# Patient Record
Sex: MALE | Race: WHITE | NOT HISPANIC OR LATINO
[De-identification: names, ages, dates, MRNs, and addresses within clinical notes are randomized per-mention and may not be internally consistent; named-entity substitution may affect disease eponyms.]

---

## 2017-04-12 ENCOUNTER — RX RENEWAL (OUTPATIENT)
Age: 47
End: 2017-04-12

## 2017-04-14 ENCOUNTER — RX RENEWAL (OUTPATIENT)
Age: 47
End: 2017-04-14

## 2018-06-07 ENCOUNTER — APPOINTMENT (OUTPATIENT)
Dept: FAMILY MEDICINE | Facility: CLINIC | Age: 48
End: 2018-06-07
Payer: MEDICAID

## 2018-06-07 ENCOUNTER — NON-APPOINTMENT (OUTPATIENT)
Age: 48
End: 2018-06-07

## 2018-06-07 VITALS
SYSTOLIC BLOOD PRESSURE: 138 MMHG | RESPIRATION RATE: 20 BRPM | HEART RATE: 72 BPM | DIASTOLIC BLOOD PRESSURE: 80 MMHG | HEIGHT: 63 IN | WEIGHT: 212 LBS | OXYGEN SATURATION: 97 % | BODY MASS INDEX: 37.56 KG/M2

## 2018-06-07 VITALS — DIASTOLIC BLOOD PRESSURE: 78 MMHG | HEART RATE: 66 BPM | RESPIRATION RATE: 16 BRPM | SYSTOLIC BLOOD PRESSURE: 112 MMHG

## 2018-06-07 PROCEDURE — 93000 ELECTROCARDIOGRAM COMPLETE: CPT

## 2018-06-07 PROCEDURE — 36415 COLL VENOUS BLD VENIPUNCTURE: CPT

## 2018-06-07 PROCEDURE — 99396 PREV VISIT EST AGE 40-64: CPT | Mod: 25

## 2018-06-07 NOTE — PHYSICAL EXAM
[No Acute Distress] : no acute distress [Well Nourished] : well nourished [Well Developed] : well developed [Well-Appearing] : well-appearing [Normal Sclera/Conjunctiva] : normal sclera/conjunctiva [PERRL] : pupils equal round and reactive to light [EOMI] : extraocular movements intact [Normal Oropharynx] : the oropharynx was normal [No JVD] : no jugular venous distention [Supple] : supple [No Lymphadenopathy] : no lymphadenopathy [Thyroid Normal, No Nodules] : the thyroid was normal and there were no nodules present [No Respiratory Distress] : no respiratory distress  [Clear to Auscultation] : lungs were clear to auscultation bilaterally [No Accessory Muscle Use] : no accessory muscle use [Normal Rate] : normal rate  [Regular Rhythm] : with a regular rhythm [Normal S1, S2] : normal S1 and S2 [No Murmur] : no murmur heard [No Carotid Bruits] : no carotid bruits [No Abdominal Bruit] : a ~M bruit was not heard ~T in the abdomen [No Varicosities] : no varicosities [No Edema] : there was no peripheral edema [No Extremity Clubbing/Cyanosis] : no extremity clubbing/cyanosis [Soft] : abdomen soft [Non Tender] : non-tender [Non-distended] : non-distended [No Masses] : no abdominal mass palpated [No HSM] : no HSM [Normal Bowel Sounds] : normal bowel sounds [Normal Sphincter Tone] : normal sphincter tone [No Mass] : no mass [Urethral Meatus] : meatus normal [Urinary Bladder Findings] : the bladder was normal on palpation [Scrotum] : the scrotum was normal [Testes Mass (___cm)] : there were no testicular masses [No Prostate Nodules] : no prostate nodules [Normal Posterior Cervical Nodes] : no posterior cervical lymphadenopathy [Normal Anterior Cervical Nodes] : no anterior cervical lymphadenopathy [No CVA Tenderness] : no CVA  tenderness [No Spinal Tenderness] : no spinal tenderness [No Joint Swelling] : no joint swelling [Grossly Normal Strength/Tone] : grossly normal strength/tone [No Rash] : no rash [Normal Gait] : normal gait [Coordination Grossly Intact] : coordination grossly intact [No Focal Deficits] : no focal deficits [Deep Tendon Reflexes (DTR)] : deep tendon reflexes were 2+ and symmetric [Normal Affect] : the affect was normal [Normal Insight/Judgement] : insight and judgment were intact [de-identified] : short neck  small posterior  pharynx

## 2018-06-07 NOTE — ASSESSMENT
[FreeTextEntry1] : snores  has  witnessed  apea day time  somnolence will order  sleep  study and  labs

## 2018-06-07 NOTE — HEALTH RISK ASSESSMENT
[Very Good] : ~his/her~  mood as very good [No falls in past year] : Patient reported no falls in the past year [0] : 2) Feeling down, depressed, or hopeless: Not at all (0) [HIV test declined] : HIV test declined [Hepatitis C test declined] : Hepatitis C test declined [None] : None [Alone] : lives alone [Employed] : employed [High School] : high school [Single] : single [Fully functional (bathing, dressing, toileting, transferring, walking, feeding)] : Fully functional (bathing, dressing, toileting, transferring, walking, feeding) [Reports changes in dental health] : Reports changes in dental health [Smoke Detector] : smoke detector [Carbon Monoxide Detector] : carbon monoxide detector [Seat Belt] :  uses seat belt [] : No [de-identified] : occ  [Change in mental status noted] : No change in mental status noted [Sexually Active] : not sexually active [Reports changes in hearing] : Reports no changes in hearing [Reports changes in vision] : Reports no changes in vision [de-identified] : construction

## 2018-06-20 ENCOUNTER — APPOINTMENT (OUTPATIENT)
Dept: FAMILY MEDICINE | Facility: CLINIC | Age: 48
End: 2018-06-20
Payer: MEDICAID

## 2018-06-20 VITALS
WEIGHT: 212 LBS | SYSTOLIC BLOOD PRESSURE: 136 MMHG | BODY MASS INDEX: 37.56 KG/M2 | RESPIRATION RATE: 16 BRPM | TEMPERATURE: 98.4 F | HEART RATE: 84 BPM | OXYGEN SATURATION: 96 % | HEIGHT: 63 IN | DIASTOLIC BLOOD PRESSURE: 80 MMHG

## 2018-06-20 DIAGNOSIS — H93.19 TINNITUS, UNSPECIFIED EAR: ICD-10-CM

## 2018-06-20 PROCEDURE — 99213 OFFICE O/P EST LOW 20 MIN: CPT

## 2018-06-20 NOTE — PHYSICAL EXAM
[No Acute Distress] : no acute distress [Well Nourished] : well nourished [Well Developed] : well developed [Normal Sclera/Conjunctiva] : normal sclera/conjunctiva [Normal Outer Ear/Nose] : the outer ears and nose were normal in appearance [Normal Oropharynx] : the oropharynx was normal [Supple] : supple [No Lymphadenopathy] : no lymphadenopathy [Thyroid Normal, No Nodules] : the thyroid was normal and there were no nodules present [No Respiratory Distress] : no respiratory distress  [Clear to Auscultation] : lungs were clear to auscultation bilaterally [No Accessory Muscle Use] : no accessory muscle use [Normal Rate] : normal rate  [Regular Rhythm] : with a regular rhythm [Normal S1, S2] : normal S1 and S2 [No Murmur] : no murmur heard

## 2019-09-19 ENCOUNTER — APPOINTMENT (OUTPATIENT)
Dept: FAMILY MEDICINE | Facility: CLINIC | Age: 49
End: 2019-09-19
Payer: MEDICAID

## 2019-09-19 VITALS
BODY MASS INDEX: 37.56 KG/M2 | DIASTOLIC BLOOD PRESSURE: 90 MMHG | OXYGEN SATURATION: 96 % | WEIGHT: 220 LBS | HEART RATE: 103 BPM | HEIGHT: 64 IN | TEMPERATURE: 98.4 F | SYSTOLIC BLOOD PRESSURE: 140 MMHG | RESPIRATION RATE: 16 BRPM

## 2019-09-19 VITALS — SYSTOLIC BLOOD PRESSURE: 146 MMHG | DIASTOLIC BLOOD PRESSURE: 96 MMHG

## 2019-09-19 DIAGNOSIS — Z87.09 PERSONAL HISTORY OF OTHER DISEASES OF THE RESPIRATORY SYSTEM: ICD-10-CM

## 2019-09-19 PROCEDURE — 99214 OFFICE O/P EST MOD 30 MIN: CPT

## 2019-09-21 RX ORDER — AMOXICILLIN 500 MG/1
500 CAPSULE ORAL
Qty: 21 | Refills: 0 | Status: COMPLETED | COMMUNITY
Start: 2019-09-16

## 2019-09-21 NOTE — HISTORY OF PRESENT ILLNESS
[FreeTextEntry8] : Pt c/o throat pain x5 days. Pt went to [facility] ER, was dx w/ pharyngitis. Pt was started on amoxicillin course, culture was neg for strep and fungal infx. Pt was advised that he have a repeat culture done.\par Pt also has hx SHELDON, would like referral to sleep study for titration and mask fitting\par Pt also found to have multiple episodes of elevated BP recently. Pt has gained 8 lbs in past year through poor diet

## 2019-09-21 NOTE — ASSESSMENT
[FreeTextEntry1] : pharyngitis - pt to continue nystating gargles, resend throat cx\par obesity - Advised lifestyle modifications for wt loss. Healthy diet and regular exercise regimen discussed w/ pt\par SHELDON - redo sleep study\par htn- start losartan, f/u in 2-3 months to recheck BP

## 2019-09-22 LAB — BACTERIA THROAT CULT: NORMAL

## 2019-10-17 ENCOUNTER — APPOINTMENT (OUTPATIENT)
Dept: FAMILY MEDICINE | Facility: CLINIC | Age: 49
End: 2019-10-17
Payer: MEDICAID

## 2019-10-17 VITALS
HEART RATE: 96 BPM | HEIGHT: 64 IN | OXYGEN SATURATION: 96 % | BODY MASS INDEX: 38.8 KG/M2 | WEIGHT: 227.25 LBS | SYSTOLIC BLOOD PRESSURE: 140 MMHG | DIASTOLIC BLOOD PRESSURE: 82 MMHG

## 2019-10-17 PROCEDURE — 20610 DRAIN/INJ JOINT/BURSA W/O US: CPT

## 2019-10-17 PROCEDURE — 99213 OFFICE O/P EST LOW 20 MIN: CPT | Mod: 25

## 2019-10-17 PROCEDURE — 90674 CCIIV4 VAC NO PRSV 0.5 ML IM: CPT

## 2019-10-17 PROCEDURE — G0008: CPT

## 2019-10-17 RX ADMIN — METHYLPREDNISOLONE ACETATE 40 MG/ML: 40 INJECTION, SUSPENSION INTRA-ARTICULAR; INTRALESIONAL; INTRAMUSCULAR; SOFT TISSUE at 00:00
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
## 2019-10-17 NOTE — ASSESSMENT
[FreeTextEntry1] : 20 cc of  serosanguineous  fluid  removed  and  40  mg of depomedrol  with lidocaine  infused

## 2019-10-18 LAB
B PERT IGG+IGM PNL SER: ABNORMAL
COLOR FLD: YELLOW
EOSINOPHIL # FLD MANUAL: 0 %
FLUID INTAKE SUBSTANCE CLASS: NORMAL
LYMPHOCYTES # FLD MANUAL: 92 %
MESOTHL CELL NFR FLD: 0 %
MONOS+MACROS NFR FLD MANUAL: 6 %
NEUTS SEG # FLD MANUAL: 2 %
NRBC # FLD: 0
RBC # FLD MANUAL: 63 /UL
TOTAL CELLS COUNTED FLD: 287 /UL
TUBE TYPE: NORMAL
UNIDENT CELLS NFR FLD MANUAL: 0 %
VARIANT LYMPHS # FLD MANUAL: 0 %

## 2019-10-18 RX ORDER — METHYLPRED ACET/NACL,ISO-OS/PF 40 MG/ML
40 VIAL (ML) INJECTION
Qty: 1 | Refills: 0 | Status: COMPLETED | OUTPATIENT
Start: 2019-10-17

## 2019-11-03 LAB — BACTERIA FLD CULT: NORMAL

## 2019-12-02 ENCOUNTER — APPOINTMENT (OUTPATIENT)
Dept: FAMILY MEDICINE | Facility: CLINIC | Age: 49
End: 2019-12-02
Payer: MEDICAID

## 2019-12-02 VITALS
OXYGEN SATURATION: 96 % | SYSTOLIC BLOOD PRESSURE: 126 MMHG | DIASTOLIC BLOOD PRESSURE: 82 MMHG | WEIGHT: 223.13 LBS | HEIGHT: 64 IN | BODY MASS INDEX: 38.09 KG/M2 | HEART RATE: 113 BPM

## 2019-12-02 VITALS — DIASTOLIC BLOOD PRESSURE: 84 MMHG | SYSTOLIC BLOOD PRESSURE: 120 MMHG

## 2019-12-02 PROCEDURE — G0444 DEPRESSION SCREEN ANNUAL: CPT | Mod: 59

## 2019-12-02 PROCEDURE — 99213 OFFICE O/P EST LOW 20 MIN: CPT | Mod: 25

## 2019-12-03 NOTE — HISTORY OF PRESENT ILLNESS
[de-identified] : Pt for f/u HTN, pt was started on losartan 25mg which pt has tolerated well. Denies CP, palpitations, dyspnea, n/v\par

## 2019-12-05 LAB
ANION GAP SERPL CALC-SCNC: 14 MMOL/L
BUN SERPL-MCNC: 13 MG/DL
CALCIUM SERPL-MCNC: 9.7 MG/DL
CHLORIDE SERPL-SCNC: 105 MMOL/L
CO2 SERPL-SCNC: 23 MMOL/L
CREAT SERPL-MCNC: 0.89 MG/DL
GLUCOSE SERPL-MCNC: 97 MG/DL
POTASSIUM SERPL-SCNC: 4.3 MMOL/L
SODIUM SERPL-SCNC: 142 MMOL/L

## 2020-07-13 ENCOUNTER — APPOINTMENT (OUTPATIENT)
Dept: FAMILY MEDICINE | Facility: CLINIC | Age: 50
End: 2020-07-13
Payer: MEDICAID

## 2020-07-13 VITALS
SYSTOLIC BLOOD PRESSURE: 110 MMHG | HEIGHT: 64 IN | OXYGEN SATURATION: 98 % | WEIGHT: 213 LBS | DIASTOLIC BLOOD PRESSURE: 80 MMHG | BODY MASS INDEX: 36.37 KG/M2 | HEART RATE: 87 BPM

## 2020-07-13 VITALS — HEART RATE: 72 BPM | RESPIRATION RATE: 1 BRPM | DIASTOLIC BLOOD PRESSURE: 80 MMHG | SYSTOLIC BLOOD PRESSURE: 110 MMHG

## 2020-07-13 DIAGNOSIS — M70.52 OTHER BURSITIS OF KNEE, LEFT KNEE: ICD-10-CM

## 2020-07-13 DIAGNOSIS — M62.08 SEPARATION OF MUSCLE (NONTRAUMATIC), OTHER SITE: ICD-10-CM

## 2020-07-13 PROCEDURE — 20610 DRAIN/INJ JOINT/BURSA W/O US: CPT

## 2020-07-13 PROCEDURE — 99214 OFFICE O/P EST MOD 30 MIN: CPT | Mod: 25

## 2020-07-13 RX ORDER — METHYLPRED ACET/NACL,ISO-OS/PF 40 MG/ML
40 VIAL (ML) INJECTION
Qty: 1 | Refills: 0 | Status: COMPLETED | OUTPATIENT
Start: 2020-07-13

## 2020-07-13 RX ADMIN — METHYLPREDNISOLONE ACETATE 40 MG/ML: 40 INJECTION, SUSPENSION INTRA-ARTICULAR; INTRALESIONAL; INTRAMUSCULAR; SOFT TISSUE at 00:00

## 2020-07-13 NOTE — ASSESSMENT
[FreeTextEntry1] : bp at  goal with wt loss never  went for sleep  study 20 cc of  serosanguineous fluid removed  from  left pre patella bursa shantell  40 mg of  medrol and knee  strapped  observation of  rectus diasthesis

## 2020-07-13 NOTE — PHYSICAL EXAM
[de-identified] : diathesis of  rectus abdomin  [de-identified] : sweling left  supraplatella bursa

## 2020-07-23 ENCOUNTER — APPOINTMENT (OUTPATIENT)
Dept: FAMILY MEDICINE | Facility: CLINIC | Age: 50
End: 2020-07-23
Payer: MEDICAID

## 2020-07-23 VITALS — SYSTOLIC BLOOD PRESSURE: 130 MMHG | DIASTOLIC BLOOD PRESSURE: 90 MMHG | HEART RATE: 94 BPM | RESPIRATION RATE: 16 BRPM

## 2020-07-23 VITALS
HEART RATE: 102 BPM | HEIGHT: 64 IN | BODY MASS INDEX: 35.51 KG/M2 | OXYGEN SATURATION: 95 % | DIASTOLIC BLOOD PRESSURE: 86 MMHG | SYSTOLIC BLOOD PRESSURE: 134 MMHG | WEIGHT: 208 LBS | RESPIRATION RATE: 16 BRPM

## 2020-07-23 DIAGNOSIS — I10 ESSENTIAL (PRIMARY) HYPERTENSION: ICD-10-CM

## 2020-07-23 PROCEDURE — 99213 OFFICE O/P EST LOW 20 MIN: CPT

## 2020-07-23 NOTE — HISTORY OF PRESENT ILLNESS
[FreeTextEntry1] : pt  c/o  fatigue  [de-identified] : sleeps poorly snores  wakes up tired falls asleep while in waiting room snores girlfriend  pt stopped  breathing

## 2020-07-23 NOTE — PHYSICAL EXAM
[No Acute Distress] : no acute distress [PERRL] : pupils equal round and reactive to light [Clear to Auscultation] : lungs were clear to auscultation bilaterally [Normal Oropharynx] : the oropharynx was normal [Normal] : soft, non-tender, non-distended, no masses palpated, no HSM and normal bowel sounds [No Edema] : there was no peripheral edema [Normal Anterior Cervical Nodes] : no anterior cervical lymphadenopathy [No Focal Deficits] : no focal deficits [de-identified] : neck size 21 inches  [Normal Insight/Judgement] : insight and judgment were intact

## 2020-09-19 ENCOUNTER — OUTPATIENT (OUTPATIENT)
Dept: OUTPATIENT SERVICES | Facility: HOSPITAL | Age: 50
LOS: 1 days | End: 2020-09-19
Payer: MEDICAID

## 2020-09-19 DIAGNOSIS — G47.33 OBSTRUCTIVE SLEEP APNEA (ADULT) (PEDIATRIC): ICD-10-CM

## 2020-09-19 PROCEDURE — 95806 SLEEP STUDY UNATT&RESP EFFT: CPT

## 2020-09-19 PROCEDURE — 95806 SLEEP STUDY UNATT&RESP EFFT: CPT | Mod: 26

## 2020-09-19 PROCEDURE — G0399: CPT

## 2020-10-08 ENCOUNTER — APPOINTMENT (OUTPATIENT)
Dept: FAMILY MEDICINE | Facility: CLINIC | Age: 50
End: 2020-10-08
Payer: MEDICAID

## 2020-10-08 VITALS
BODY MASS INDEX: 35.51 KG/M2 | TEMPERATURE: 98 F | SYSTOLIC BLOOD PRESSURE: 130 MMHG | HEART RATE: 81 BPM | WEIGHT: 208 LBS | OXYGEN SATURATION: 96 % | HEIGHT: 64 IN | DIASTOLIC BLOOD PRESSURE: 82 MMHG

## 2020-10-08 VITALS — HEART RATE: 80 BPM | DIASTOLIC BLOOD PRESSURE: 94 MMHG | RESPIRATION RATE: 16 BRPM | SYSTOLIC BLOOD PRESSURE: 140 MMHG

## 2020-10-08 DIAGNOSIS — S99.912A UNSPECIFIED INJURY OF LEFT ANKLE, INITIAL ENCOUNTER: ICD-10-CM

## 2020-10-08 PROCEDURE — 99214 OFFICE O/P EST MOD 30 MIN: CPT
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
## 2020-10-08 NOTE — PHYSICAL EXAM
[No Acute Distress] : no acute distress [PERRL] : pupils equal round and reactive to light [Normal Oropharynx] : the oropharynx was normal [Supple] : supple [Clear to Auscultation] : lungs were clear to auscultation bilaterally [Regular Rhythm] : with a regular rhythm [No Murmur] : no murmur heard [No Edema] : there was no peripheral edema [Normal Supraclavicular Nodes] : no supraclavicular lymphadenopathy [Normal Posterior Cervical Nodes] : no posterior cervical lymphadenopathy [Normal Anterior Cervical Nodes] : no anterior cervical lymphadenopathy [No Rash] : no rash [de-identified] : TENDERNESS LATERAL  ANKLE AND TIBEA SLLIGHT SWELLING

## 2020-10-23 ENCOUNTER — APPOINTMENT (OUTPATIENT)
Dept: DISASTER EMERGENCY | Facility: CLINIC | Age: 50
End: 2020-10-23

## 2020-10-23 DIAGNOSIS — Z01.818 ENCOUNTER FOR OTHER PREPROCEDURAL EXAMINATION: ICD-10-CM

## 2020-10-24 LAB — SARS-COV-2 N GENE NPH QL NAA+PROBE: NOT DETECTED

## 2020-10-25 ENCOUNTER — OUTPATIENT (OUTPATIENT)
Dept: OUTPATIENT SERVICES | Facility: HOSPITAL | Age: 50
LOS: 1 days | End: 2020-10-25
Payer: MEDICAID

## 2020-10-25 DIAGNOSIS — G47.33 OBSTRUCTIVE SLEEP APNEA (ADULT) (PEDIATRIC): ICD-10-CM

## 2020-10-25 PROCEDURE — 95810 POLYSOM 6/> YRS 4/> PARAM: CPT

## 2020-10-25 PROCEDURE — 95811 POLYSOM 6/>YRS CPAP 4/> PARM: CPT | Mod: 26

## 2020-10-25 PROCEDURE — 95811 POLYSOM 6/>YRS CPAP 4/> PARM: CPT

## 2020-11-05 ENCOUNTER — APPOINTMENT (OUTPATIENT)
Dept: PULMONOLOGY | Facility: CLINIC | Age: 50
End: 2020-11-05
Payer: MEDICAID

## 2020-11-05 VITALS
WEIGHT: 213 LBS | OXYGEN SATURATION: 96 % | HEART RATE: 82 BPM | HEIGHT: 62 IN | BODY MASS INDEX: 39.2 KG/M2 | SYSTOLIC BLOOD PRESSURE: 140 MMHG | DIASTOLIC BLOOD PRESSURE: 69 MMHG

## 2020-11-05 VITALS — RESPIRATION RATE: 16 BRPM

## 2020-11-05 DIAGNOSIS — E66.9 OBESITY, UNSPECIFIED: ICD-10-CM

## 2020-11-05 DIAGNOSIS — G47.33 OBSTRUCTIVE SLEEP APNEA (ADULT) (PEDIATRIC): ICD-10-CM

## 2020-11-05 DIAGNOSIS — G47.31 PRIMARY CENTRAL SLEEP APNEA: ICD-10-CM

## 2020-11-05 PROCEDURE — 99072 ADDL SUPL MATRL&STAF TM PHE: CPT

## 2020-11-05 PROCEDURE — 99204 OFFICE O/P NEW MOD 45 MIN: CPT

## 2020-11-05 RX ORDER — TRAMADOL HYDROCHLORIDE 50 MG/1
50 TABLET, COATED ORAL
Qty: 20 | Refills: 0 | Status: DISCONTINUED | COMMUNITY
Start: 2020-10-08 | End: 2020-11-05

## 2020-11-05 RX ORDER — LOSARTAN POTASSIUM 25 MG/1
25 TABLET, FILM COATED ORAL DAILY
Qty: 90 | Refills: 1 | Status: DISCONTINUED | COMMUNITY
Start: 2019-09-19 | End: 2020-11-05

## 2020-11-05 RX ORDER — NYSTATIN 100000 [USP'U]/ML
100000 SUSPENSION ORAL
Qty: 280 | Refills: 0 | Status: DISCONTINUED | COMMUNITY
Start: 2019-09-19 | End: 2020-11-05

## 2020-11-05 NOTE — HISTORY OF PRESENT ILLNESS
[TextBox_4] : The patient is a 50-year-old male who has been complaining of excessive daytime sleepiness for many years. Loud snoring was noted with interrupted sleep at night. He underwent a home sleep study in September and more recently a CPAP/BiPAP titration.He is obese with an 18 inch collar. Denies shortness of breath cough or wheeze. He reports he was sleepy as a child. [Obstructive Sleep Apnea] : obstructive sleep apnea [Central sleep apnea (CSA)/ Mixed SHELDON] : central sleep apnea (CSA)/ mixed SHELDON [Awakes Unrefreshed] : awakes unrefreshed [Awakes with Headache] : awakes with headache [Difficulty Initiating Sleep] : difficulty initiating sleep [Difficulty Maintaining Sleep] : difficulty maintaining sleep [Snoring] : snoring [Witnessed Apneas] : witnessed apneas [Home] : home [TextBox_77] : 830pm [TextBox_79] : 7am [TextBox_81] : 30 [TextBox_83] : christiano. [TextBox_89] : 3-4 [TextBox_100] : 9/20 [TextBox_108] : 51 [TextBox_112] : 68 [TextBox_116] : 76 [TextBox_104] : 10/20 [TextBox_165] : I reviewed the patient's sleep study with the patient.\par The patient's CPAP study showed that he was therapeutic at BiPAP of 17/13. There were treatment emergent central apneas at all pressures and a backup rate of 10 was added. [ESS] : 18

## 2020-11-05 NOTE — PHYSICAL EXAM
[No Acute Distress] : no acute distress [Low Lying Soft Palate] : low lying soft palate [Elongated Uvula] : elongated uvula [Enlarged Base of the Tongue] : enlarged base of the tongue [IV] : Mallampati Class: IV [Normal Appearance] : normal appearance [Neck Circumference: ___] : neck circumference: [unfilled] [No Neck Mass] : no neck mass [Normal Rate/Rhythm] : normal rate/rhythm [Normal S1, S2] : normal s1, s2 [No Murmurs] : no murmurs [No Resp Distress] : no resp distress [Clear to Auscultation Bilaterally] : clear to auscultation bilaterally [No Abnormalities] : no abnormalities [Benign] : benign [Normal Gait] : normal gait [No Clubbing] : no clubbing [No Cyanosis] : no cyanosis [No Edema] : no edema [FROM] : FROM [Normal Color/ Pigmentation] : normal color/ pigmentation [No Focal Deficits] : no focal deficits [Oriented x3] : oriented x3 [Normal Affect] : normal affect [TextBox_2] : obese

## 2020-11-05 NOTE — ASSESSMENT
[FreeTextEntry1] : The patient has mixed obstructive and central sleep apnea significant treatment emergent central apneas. I explained the pathophysiology of sleep apnea to the patient and its association with excessive sleepiness and hypertension.\par \par BiPAP 17/13 with a backup rate of 10 was ordered for the patient. The patient was instructed to begin wearing it with the goal being all night every night. The patient was given minimum acceptable treatment parameters. He will follow up here 3 months to review compliance and efficacy.

## 2020-12-21 PROBLEM — Z87.09 HISTORY OF PHARYNGITIS: Status: RESOLVED | Noted: 2019-09-19 | Resolved: 2020-12-21

## 2021-02-05 ENCOUNTER — APPOINTMENT (OUTPATIENT)
Dept: PULMONOLOGY | Facility: CLINIC | Age: 51
End: 2021-02-05

## 2021-03-24 ENCOUNTER — APPOINTMENT (OUTPATIENT)
Dept: FAMILY MEDICINE | Facility: CLINIC | Age: 51
End: 2021-03-24
Payer: MEDICAID

## 2021-03-24 DIAGNOSIS — U07.1 COVID-19: ICD-10-CM

## 2021-03-24 PROCEDURE — 99213 OFFICE O/P EST LOW 20 MIN: CPT | Mod: 95

## 2021-03-24 RX ORDER — METHYLPREDNISOLONE 4 MG/1
4 TABLET ORAL
Qty: 1 | Refills: 0 | Status: ACTIVE | COMMUNITY
Start: 2021-03-24 | End: 1900-01-01

## 2021-06-16 ENCOUNTER — APPOINTMENT (OUTPATIENT)
Dept: FAMILY MEDICINE | Facility: CLINIC | Age: 51
End: 2021-06-16
Payer: MEDICAID

## 2021-06-16 VITALS
OXYGEN SATURATION: 97 % | HEART RATE: 80 BPM | TEMPERATURE: 97.7 F | WEIGHT: 211 LBS | HEIGHT: 62 IN | BODY MASS INDEX: 38.83 KG/M2 | DIASTOLIC BLOOD PRESSURE: 80 MMHG | SYSTOLIC BLOOD PRESSURE: 132 MMHG

## 2021-06-16 PROCEDURE — 99072 ADDL SUPL MATRL&STAF TM PHE: CPT

## 2021-06-16 PROCEDURE — 99213 OFFICE O/P EST LOW 20 MIN: CPT

## 2021-06-21 ENCOUNTER — APPOINTMENT (OUTPATIENT)
Age: 51
End: 2021-06-21
Payer: MEDICAID

## 2021-06-21 VITALS
WEIGHT: 220 LBS | DIASTOLIC BLOOD PRESSURE: 93 MMHG | BODY MASS INDEX: 37.56 KG/M2 | HEIGHT: 64 IN | HEART RATE: 78 BPM | SYSTOLIC BLOOD PRESSURE: 135 MMHG

## 2021-06-21 DIAGNOSIS — S46.212D STRAIN OF MUSCLE, FASCIA AND TENDON OF OTHER PARTS OF BICEPS, LEFT ARM, SUBSEQUENT ENCOUNTER: ICD-10-CM

## 2021-06-21 DIAGNOSIS — Z78.9 OTHER SPECIFIED HEALTH STATUS: ICD-10-CM

## 2021-06-21 PROCEDURE — 99204 OFFICE O/P NEW MOD 45 MIN: CPT

## 2021-06-21 PROCEDURE — 73080 X-RAY EXAM OF ELBOW: CPT | Mod: LT

## 2021-06-21 PROCEDURE — 99072 ADDL SUPL MATRL&STAF TM PHE: CPT

## 2021-08-10 ENCOUNTER — RX RENEWAL (OUTPATIENT)
Age: 51
End: 2021-08-10

## 2021-08-10 RX ORDER — MELOXICAM 7.5 MG/1
7.5 TABLET ORAL DAILY
Qty: 30 | Refills: 0 | Status: ACTIVE | COMMUNITY
Start: 2021-06-21 | End: 1900-01-01

## 2022-07-19 ENCOUNTER — RX RENEWAL (OUTPATIENT)
Age: 52
End: 2022-07-19

## 2022-07-19 RX ORDER — ALBUTEROL SULFATE 90 UG/1
108 (90 BASE) INHALANT RESPIRATORY (INHALATION)
Qty: 1 | Refills: 2 | Status: ACTIVE | COMMUNITY
Start: 2021-03-24 | End: 1900-01-01

## 2022-11-02 NOTE — HISTORY OF PRESENT ILLNESS
What Type Of Note Output Would You Prefer (Optional)?: Standard Output How Severe Is Your Skin Lesion?: moderate [FreeTextEntry8] : left knee  swollen several mos works on  knees  swelling getting worse  not  taking  bp   meds lost  15 lbs also   noticed a  swelling in abd  Has Your Skin Lesion Been Treated?: not been treated Is This A New Presentation, Or A Follow-Up?: Skin Lesion

## 2024-02-16 ENCOUNTER — NON-APPOINTMENT (OUTPATIENT)
Age: 54
End: 2024-02-16

## 2024-07-25 ENCOUNTER — APPOINTMENT (OUTPATIENT)
Dept: ORTHOPEDIC SURGERY | Facility: CLINIC | Age: 54
End: 2024-07-25

## 2024-07-25 VITALS — BODY MASS INDEX: 35 KG/M2 | HEIGHT: 64 IN | WEIGHT: 205 LBS

## 2024-07-25 DIAGNOSIS — M75.102 UNSPECIFIED ROTATOR CUFF TEAR OR RUPTURE OF LEFT SHOULDER, NOT SPECIFIED AS TRAUMATIC: ICD-10-CM

## 2024-07-25 PROCEDURE — 99204 OFFICE O/P NEW MOD 45 MIN: CPT

## 2024-07-29 ENCOUNTER — APPOINTMENT (OUTPATIENT)
Dept: ORTHOPEDIC SURGERY | Facility: CLINIC | Age: 54
End: 2024-07-29

## 2024-07-29 ENCOUNTER — APPOINTMENT (OUTPATIENT)
Dept: PAIN MANAGEMENT | Facility: CLINIC | Age: 54
End: 2024-07-29

## 2024-07-29 ENCOUNTER — APPOINTMENT (OUTPATIENT)
Dept: PAIN MANAGEMENT | Facility: CLINIC | Age: 54
End: 2024-07-29
Payer: COMMERCIAL

## 2024-07-29 VITALS — HEIGHT: 64 IN | BODY MASS INDEX: 35.17 KG/M2 | WEIGHT: 206 LBS

## 2024-07-29 DIAGNOSIS — M54.12 RADICULOPATHY, CERVICAL REGION: ICD-10-CM

## 2024-07-29 DIAGNOSIS — M50.10 CERVICAL DISC DISORDER WITH RADICULOPATHY, UNSPECIFIED CERVICAL REGION: ICD-10-CM

## 2024-07-29 PROBLEM — M75.102 ROTATOR CUFF TEAR, LEFT: Status: ACTIVE | Noted: 2024-07-25

## 2024-07-29 PROBLEM — M79.18 MYOFASCIAL PAIN SYNDROME, CERVICAL: Status: ACTIVE | Noted: 2024-07-29

## 2024-07-29 PROCEDURE — 99204 OFFICE O/P NEW MOD 45 MIN: CPT

## 2024-07-29 RX ORDER — TIZANIDINE 4 MG/1
4 TABLET ORAL TWICE DAILY
Qty: 60 | Refills: 1 | Status: ACTIVE | COMMUNITY
Start: 2024-07-29 | End: 1900-01-01

## 2024-07-29 NOTE — PHYSICAL EXAM
[Right] : right hand [] : positive tinel [de-identified] : Constitutional:   - No acute distress   - Obese  Neurological:   - normal mood and affect   - alert and oriented x 3     Cardiovascular:   - grossly normal   Cervical Spine Exam:   Inspection:   erythema (-)   ecchymosis (-)   rashes (-)    Palpation:                                                    Cervical paraspinal tenderness:         R (+); L(+)  Upper trapezius tenderness:              R (+); L (+)  Rhomboids tenderness:                      R (-); L (-)  Occipital Ridge:                                    R (-); L (-)  Supraspinatus tenderness:                 R (-); L (-)   ROM: Reduced ROM all planes pain throughout ROM testing  Strength Testing:              Deltoid                           R (5/5); L (5/5)  Biceps:                          R (4/5); L (4/5)  Triceps:                         R (5/5); L (5/5)  Finger Abductors:         R (5/5); L (5/5)  Grasp:                           R (4/5); L (4/5)   Special Testing:  Spurling Test:                  R (+); L (+)  Facet load test:               R (-); L (-)   Neuro:  SILT throughout right upper extremity  SILT throughout left upper extremity   Reflexes:  Biceps   -           R (2+); L (2+)  Triceps  -           R (2+); L (2+)  Brachioradialis- R (2+); L (2+)     No ankle clonus

## 2024-07-29 NOTE — IMAGING
[de-identified] : The patient is a well appearing 53 year  old male of their stated age.  Neck is supple & nontender to palpation. Negative Spurling's test.    Effected Shoulder: LEFT Inspection:  Scapula Winging: Negative Deformity: None Erythema: None  Ecchymosis: None Abrasions: None Effusion: None     Range of Motion: Active Forward Flexion: 180 degrees   Active Abduction: 180 degrees  Passive Forward Flexion: 180 degrees  Passive Abduction: 180 degrees  ER @ 90 degrees: 90 degrees IR @ 90 degrees: 45 degrees ER @ 0 degrees: 50 degrees     Motor Exam:  Forward Flexion: 5 out of 5  Flexion Plane of Scapula: 5 out of 5  Abduction: 5 out of 5  Internal Rotation: 5 out of 5  External Rotation: 5 out of 5 Distal Motor Strength: 5 out of 5     Stability Testing: Anterior: 1+ Posterior: 1+ Sulcus N: 1+ Sulcus ER: 1+ Provocative Tests: Drop Arm: Negative Impingement: Negative Pingree: Negative X-Arm Adduction: Negative Belly Press: Negative Bear Hug: Negative Lift Off: Negative Apprehension: Negative Relocation: Negative Posterior Load & Shift: Negative     Palpation: AC Joint: Nontender Clavicle: Nontender SC Joint: Nontender Bicepital Groove: Nontender Coracoid Process: Nontender Pectoralis Minor Tendon: Nontender  Pectoralis Major Tendon: Nontender & palpably intact Latissimus Dorsi: Nontender  Proximal Humerus: Nontender Scapula Body: Nontender  Medial Scapula Boarder: Nontender Scapula Spine: Nontender   Neurologic Exam: Sensation to Light Touch:  Axillary: Grossly intact Ulnar: Grossly intact Radial: Grossly intact Median: Grossly intact Other:  N/A Circulatory/Pulses: Ulnar: 2+ Radial: 2+ Other Pertinent Findings: None     Contralateral Shoulder   Range of Motion: Active Forward Flexion: 180 degrees  Active Abduction: 180 degrees  Passive Forward Flexion: 180 degrees  Passive Abduction: 180 degrees  ER @ 90 degrees: 90 degrees IR @ 90 degrees: 45 degrees ER @ 0 degrees: 50 degrees    Motor Exam: Forward Flexion: 5 out of 5 Flexion Plane of Scapula: 5 out of 5 Abduction: 5 out of 5 Internal Rotation: 5 out of 5 External Rotation: 5 out of 5 Distal Motor Strength: 5 out of 5 Stability Testing: Anterior: 1+ Posterior: 1+ Sulcus N: 1+ Sulcus ER: 1+ Other Pertinent Findings: None     Assessment: The patient is a 53-year-old male with let shoulder pain and radiographic and physical exam findings consistent with X.   The patient's condition is acute Documents/Results Reviewed Today: MRI left shoulder, MRI cervical spine Tests/Studies Independently Interpreted Today: MRI left shoulder small central full thickness supraspinatus tear, cyst at level of tear biceps ruptured and retracted into groove,  MRI cervical spine reveals evidence of large disc herniation of C6-C7.  Pertinent findings include: 180/180/90/45/50,  Confounding medical conditions/concerns: None   Plan: Discussed treatment options for the patient's rotator cuff tear. Recommended starting with conservative treatment options. Discussed with patient is pain is persistent he can decide if he wants an injection. Patient will start Physical Therapy, HEP and stretching. Discussed with patient he needs to be off suboxone for 6 months before getting surgery. Recommended seeing pain management for his neck and back as well before deciding to do surgery on his shoulder.  Tests Ordered: None  Prescription Medications Ordered: Discussed appropriate use of OTC anti-inflammatories and analgesics (including but not limited to Aleve, Advil, Tylenol, Motrin, Ibuprofen, Voltaren gel, etc.) Braces/DME Ordered: None Activity/Work/Sports Status: As tolerated Additional Instructions: None Follow-Up:  4 weeks   The patient's current medication management of their orthopedic diagnosis was reviewed today: (1) We discussed a comprehensive treatment plan that included possible pharmaceutical management involving the use of prescription strength medications including but not limited to options such as oral Naprosyn 500mg BID, once daily Meloxicam 15 mg, or 500-650 mg Tylenol versus over-the-counter oral medications and topical prescription NSAID Pennsaid vs over the counter Voltaren gel.  Based on our extensive discussion, the patient declined prescription medication and will use over the counter Advil, Alleve, Voltaren Gel or Tylenol as directed. (2) There is a moderate risk of morbidity with further treatment, especially from use of prescription strength medications and possible side effects of these medications which include upset stomach with oral medications, skin reactions to topical medications and cardiac/renal issues with long term use. (3) I recommended that the patient follow-up with their medical physician to discuss any significant specific potential issues with long term medication use such as interactions with current medications or with exacerbation of underlying medical comorbidities. (4) The benefits and risks associated with use of injectable, oral or topical, prescription and over the counter anti-inflammatory medications were discussed with the patient. The patient voiced understanding of the risks including but not limited to bleeding, stroke, kidney dysfunction, heart disease, and were referred to the black box warning label for further information.  IAngelica attest that this documentation has been prepared under the direction and in the presence of Provider Dr. Edmundo Rose.  The documentation recorded by the scribe accurately reflects the services IDr. Edmundo, personally performed and the decisions made by me.

## 2024-07-29 NOTE — IMAGING
[de-identified] : The patient is a well appearing 53 year  old male of their stated age.  Neck is supple & nontender to palpation. Negative Spurling's test.    Effected Shoulder: LEFT Inspection:  Scapula Winging: Negative Deformity: None Erythema: None  Ecchymosis: None Abrasions: None Effusion: None     Range of Motion: Active Forward Flexion: 180 degrees   Active Abduction: 180 degrees  Passive Forward Flexion: 180 degrees  Passive Abduction: 180 degrees  ER @ 90 degrees: 90 degrees IR @ 90 degrees: 45 degrees ER @ 0 degrees: 50 degrees     Motor Exam:  Forward Flexion: 5 out of 5  Flexion Plane of Scapula: 5 out of 5  Abduction: 5 out of 5  Internal Rotation: 5 out of 5  External Rotation: 5 out of 5 Distal Motor Strength: 5 out of 5     Stability Testing: Anterior: 1+ Posterior: 1+ Sulcus N: 1+ Sulcus ER: 1+ Provocative Tests: Drop Arm: Negative Impingement: Negative [employer]: Negative X-Arm Adduction: Negative Belly Press: Negative Bear Hug: Negative Lift Off: Negative Apprehension: Negative Relocation: Negative Posterior Load & Shift: Negative     Palpation: AC Joint: Nontender Clavicle: Nontender SC Joint: Nontender Bicepital Groove: Nontender Coracoid Process: Nontender Pectoralis Minor Tendon: Nontender  Pectoralis Major Tendon: Nontender & palpably intact Latissimus Dorsi: Nontender  Proximal Humerus: Nontender Scapula Body: Nontender  Medial Scapula Boarder: Nontender Scapula Spine: Nontender   Neurologic Exam: Sensation to Light Touch:  Axillary: Grossly intact Ulnar: Grossly intact Radial: Grossly intact Median: Grossly intact Other:  N/A Circulatory/Pulses: Ulnar: 2+ Radial: 2+ Other Pertinent Findings: None     Contralateral Shoulder   Range of Motion: Active Forward Flexion: 180 degrees  Active Abduction: 180 degrees  Passive Forward Flexion: 180 degrees  Passive Abduction: 180 degrees  ER @ 90 degrees: 90 degrees IR @ 90 degrees: 45 degrees ER @ 0 degrees: 50 degrees    Motor Exam: Forward Flexion: 5 out of 5 Flexion Plane of Scapula: 5 out of 5 Abduction: 5 out of 5 Internal Rotation: 5 out of 5 External Rotation: 5 out of 5 Distal Motor Strength: 5 out of 5 Stability Testing: Anterior: 1+ Posterior: 1+ Sulcus N: 1+ Sulcus ER: 1+ Other Pertinent Findings: None     Assessment: The patient is a 53-year-old male with let shoulder pain and radiographic and physical exam findings consistent with X.   The patient's condition is acute Documents/Results Reviewed Today: MRI left shoulder, MRI cervical spine Tests/Studies Independently Interpreted Today: MRI left shoulder small central full thickness supraspinatus tear, cyst at level of tear biceps ruptured and retracted into groove,  MRI cervical spine reveals evidence of large disc herniation of C6-C7.  Pertinent findings include: 180/180/90/45/50,  Confounding medical conditions/concerns: None   Plan: Discussed treatment options for the patient's rotator cuff tear. Recommended starting with conservative treatment options. Discussed with patient is pain is persistent he can decide if he wants an injection. Patient will start Physical Therapy, HEP and stretching. Discussed with patient he needs to be off suboxone for 6 months before getting surgery. Recommended seeing pain management for his neck and back as well before deciding to do surgery on his shoulder.  Tests Ordered: None  Prescription Medications Ordered: Discussed appropriate use of OTC anti-inflammatories and analgesics (including but not limited to Aleve, Advil, Tylenol, Motrin, Ibuprofen, Voltaren gel, etc.) Braces/DME Ordered: None Activity/Work/Sports Status: As tolerated Additional Instructions: None Follow-Up:  4 weeks   The patient's current medication management of their orthopedic diagnosis was reviewed today: (1) We discussed a comprehensive treatment plan that included possible pharmaceutical management involving the use of prescription strength medications including but not limited to options such as oral Naprosyn 500mg BID, once daily Meloxicam 15 mg, or 500-650 mg Tylenol versus over-the-counter oral medications and topical prescription NSAID Pennsaid vs over the counter Voltaren gel.  Based on our extensive discussion, the patient declined prescription medication and will use over the counter Advil, Alleve, Voltaren Gel or Tylenol as directed. (2) There is a moderate risk of morbidity with further treatment, especially from use of prescription strength medications and possible side effects of these medications which include upset stomach with oral medications, skin reactions to topical medications and cardiac/renal issues with long term use. (3) I recommended that the patient follow-up with their medical physician to discuss any significant specific potential issues with long term medication use such as interactions with current medications or with exacerbation of underlying medical comorbidities. (4) The benefits and risks associated with use of injectable, oral or topical, prescription and over the counter anti-inflammatory medications were discussed with the patient. The patient voiced understanding of the risks including but not limited to bleeding, stroke, kidney dysfunction, heart disease, and were referred to the black box warning label for further information.  IAngelica attest that this documentation has been prepared under the direction and in the presence of Provider Dr. Edmundo Rose.  The documentation recorded by the scribe accurately reflects the services IDr. [email or web address], personally performed and the decisions made by me.

## 2024-07-29 NOTE — DATA REVIEWED
[Cervical Spine] : cervical spine [MRI] : MRI [Left] : left [Shoulder] : shoulder [Report was reviewed and noted in the chart] : The report was reviewed and noted in the chart [I independently reviewed and interpreted images and report] : I independently reviewed and interpreted images and report [I reviewed the films/CD and additionally noted] : I reviewed the films/CD and additionally noted [FreeTextEntry1] : MRI cervical spine reveals evidence of large disc herniation of C6-C7.  [FreeTextEntry2] : MRI left shoulder small central full thickness supraspinatus tear, cyst at level of tear biceps ruptured and retracted into groove,

## 2024-07-29 NOTE — ASSESSMENT
[FreeTextEntry1] : A discussion regarding available pain management treatment options occurred with the patient.  These included interventional, rehabilitative, pharmacological, and alternative modalities. We will proceed with the following:    Interventional treatment options:   - Proceed with C7-T1 TRUDI with fluoroscopic guidance - Explained diagnostic and potentially therapeutic role for indicated procedure to distinguish between cervical spine and shoulder pathology - Consideration for TPI for refractory cervical myofascial pain component - see additional instructions below    Rehabilitative options:   - initiate trial of physical therapy for cervical spine - participation in active HEP was discussed    Medication based treatment options:   - Continue meloxicam 7.5-15 mg daily as needed - Add tizanidine 4 mg up to BID as needed for spasm - see additional instructions below    Complementary treatment options:   - Weight management and lifestyle modifications discussed   - See additional instructions below    Additional treatment recommendations as follows:   - Advised orthopedic spine surgical evaluation; bilateral cervical radiculopathy with upper extremity weakness - Advised orthopedic follow-up as indicated for shoulder pain  - Follow up 1-2 weeks post injection for assessment of efficacy and further treatment recommendations  We have discussed the risks, benefits, and alternatives for NSAID therapy including but not limited to the risk of bleeding, thrombosis, gastric mucosal irritation/ulceration, allergic reaction and kidney dysfunction.  The patient verbalizes an understanding.  The risks, benefits and alternatives of the proposed procedure were explained in detail with the patient. The risks outlined include but are not limited to infection, bleeding, post- dural puncture headache, nerve injury, a temporary increase in pain, failure to resolve symptoms, need for future interventions, allergic reaction, and possible elevation of blood sugar in diabetics if using corticosteroid.  All questions were answered to patient's apparent satisfaction, and he/she verbalized an understanding.  The documentation recorded by the scribe, in my presence, accurately reflects the service I personally performed and the decisions made by me with my edits as appropriate.   I, Tai Rondon acting as scribe, attest that this documentation has been prepared under the direction and in the presence of Provider Ha Shaffer DO.

## 2024-07-29 NOTE — REASON FOR VISIT
[Initial Consultation] : an initial pain management consultation [FreeTextEntry2] : Neck, low back and B/L LE pain
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
## 2024-07-29 NOTE — DATA REVIEWED
[Cervical Spine] : cervical spine [Report was reviewed and noted in the chart] : The report was reviewed and noted in the chart [I reviewed the films/CD] : I reviewed the films/CD

## 2024-08-01 ENCOUNTER — APPOINTMENT (OUTPATIENT)
Dept: ORTHOPEDIC SURGERY | Facility: CLINIC | Age: 54
End: 2024-08-01

## 2024-08-05 ENCOUNTER — APPOINTMENT (OUTPATIENT)
Dept: ORTHOPEDIC SURGERY | Facility: CLINIC | Age: 54
End: 2024-08-05

## 2024-08-05 PROBLEM — M50.20 HERNIATED CERVICAL DISC: Status: ACTIVE | Noted: 2024-08-05

## 2024-08-05 PROBLEM — M48.061 LUMBAR FORAMINAL STENOSIS: Status: ACTIVE | Noted: 2024-08-05

## 2024-08-05 PROBLEM — M47.816 LUMBAR SPONDYLOSIS: Status: ACTIVE | Noted: 2024-08-05

## 2024-08-05 PROCEDURE — 99203 OFFICE O/P NEW LOW 30 MIN: CPT

## 2024-08-09 ENCOUNTER — APPOINTMENT (OUTPATIENT)
Dept: AFTER HOURS CARE | Facility: EMERGENCY ROOM | Age: 54
End: 2024-08-09

## 2024-08-09 PROBLEM — H10.32 ACUTE BACTERIAL CONJUNCTIVITIS OF LEFT EYE: Status: ACTIVE | Noted: 2024-08-09

## 2024-08-09 PROCEDURE — 99204 OFFICE O/P NEW MOD 45 MIN: CPT

## 2024-08-09 NOTE — PLAN
[FreeTextEntry1] :  Middle aged m with conjunctivitis.  Patient states that erythromycin has failed in the past, will send polytrim, advise warm compresses and f/u with pcp.

## 2024-08-09 NOTE — PHYSICAL EXAM
[TextEntry] :  PLEASE SEE HPI FOR ADDITIONAL RELEVANT PHYSICAL EXAM FINDINGS GENERAL: no acute distress, nontoxic HEAD: normocephalic EYES: no scleral icterus. left eye wnl, no foreign body, EOMI PERRLA no visible crusting NECK: trachea midline, Full ROM RESP: no respiratory distress ABD: nondistended MSK: no gross deformity NEURO: alert  SKIN: no rash

## 2024-08-09 NOTE — HISTORY OF PRESENT ILLNESS
[Home] : at home, [unfilled] , at the time of the visit. [Other Location: e.g. Home (Enter Location, City,State)___] : at [unfilled] [Verbal consent obtained from patient] : the patient, [unfilled] [FreeTextEntry8] : Middle aged m with left eye crusting and oozing since this morning. He got a new puppy and his fiancee developed pink eye and now he thinks he has it.  He denies vision changes or fever. No does not wear contacts or glasses.  No precipitating exacerbating or alleviating factors. No fever.

## 2024-08-11 NOTE — DISCUSSION/SUMMARY
[de-identified] : 53 Y M w/ small HNPs. MRI reviewed & discussed with patient today.  Patient was educated and informed on their condition along with the expected outcomes. Best to treat conservatively. Follow up with Dr. Shaffer and proceed with injection therapies if needed.  Patient was provided with a referral for cervical physical therapy to work on stretching, strengthening and range of motion. Patient was provided with a cervical home exercise program.  Moving forward I'd like to see as needed.    Prior to appointment and during encounter with patient extensive medical records were reviewed including but not limited to, hospital records, out patient records, imaging results, and lab data. During this appointment the patient was examined, diagnoses were discussed and explained in a face to face manner. In addition extensive time was spent reviewing aforementioned diagnostic studies. Counseling including abnormal image results, differential diagnoses, treatment options, risk and benefits, lifestyle changes, current condition, and current medications was performed. Patient's comments, questions, and concerns were address and patient verbalized understanding. Based on this patient's presentation at our office, which is an orthopedic spine surgeon's office, this patient inherently / intrinsically has a risk, however minute, of developing issues such as Cauda equina syndrome, bowel and bladder changes, or progression of motor or neurological deficits such as paralysis which may be permanent.   I, Edda Foy, attest that this documentation has been prepared under the direction and in the presence of provider Eleazar Nelson MD.
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
## 2024-08-11 NOTE — DISCUSSION/SUMMARY
[de-identified] : 53 Y M w/ small HNPs. MRI reviewed & discussed with patient today.  Patient was educated and informed on their condition along with the expected outcomes. Best to treat conservatively. Follow up with Dr. Shaffer and proceed with injection therapies if needed.  Patient was provided with a referral for cervical physical therapy to work on stretching, strengthening and range of motion. Patient was provided with a cervical home exercise program.  Moving forward I'd like to see as needed.    Prior to appointment and during encounter with patient extensive medical records were reviewed including but not limited to, hospital records, out patient records, imaging results, and lab data. During this appointment the patient was examined, diagnoses were discussed and explained in a face to face manner. In addition extensive time was spent reviewing aforementioned diagnostic studies. Counseling including abnormal image results, differential diagnoses, treatment options, risk and benefits, lifestyle changes, current condition, and current medications was performed. Patient's comments, questions, and concerns were address and patient verbalized understanding. Based on this patient's presentation at our office, which is an orthopedic spine surgeon's office, this patient inherently / intrinsically has a risk, however minute, of developing issues such as Cauda equina syndrome, bowel and bladder changes, or progression of motor or neurological deficits such as paralysis which may be permanent.   I, Edda Foy, attest that this documentation has been prepared under the direction and in the presence of provider Eleazar Nelson MD.
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
## 2024-08-11 NOTE — PHYSICAL EXAM
[de-identified] : Constitutional: - General Appearance: Unremarkable Body Habitus Well Developed Well Nourished Body Habitus No Deformities Well Groomed Ability To communicate: Normal Neurologic: Global sensation is intact to upper and lower extremities. See examination of Neck and/or Spine for exceptions. Orientation to Time, Place and Person is: Normal Mood And Affect is Normal Skin: - Head/Face, Right Upper/Lower Extremity, Left Upper/Lower Extremity: Normal See Examination of Neck and/or Spine for exceptions Cardiovascular: Peripheral Cardiovascular System is Normal Palpation of Lymph Nodes: Normal Palpation of lymph nodes in: Axilla, Cervical, Inguinal Abnormal Palpation of lymph nodes in: None  [de-identified] : Behavior is odd. Glassy eyes. Questioned carefully on his drug use, admits to suboxone use.  he is very vague on answering questions.  [de-identified] : LT deltoid weakness secondary to intrinsic problem.  [de-identified] : extension 5 degrees [] : clonus not sustained at ankle [de-identified] : Diffused non Dermatol paresthesia's in UE.

## 2024-08-11 NOTE — PHYSICAL EXAM
[de-identified] : Constitutional: - General Appearance: Unremarkable Body Habitus Well Developed Well Nourished Body Habitus No Deformities Well Groomed Ability To communicate: Normal Neurologic: Global sensation is intact to upper and lower extremities. See examination of Neck and/or Spine for exceptions. Orientation to Time, Place and Person is: Normal Mood And Affect is Normal Skin: - Head/Face, Right Upper/Lower Extremity, Left Upper/Lower Extremity: Normal See Examination of Neck and/or Spine for exceptions Cardiovascular: Peripheral Cardiovascular System is Normal Palpation of Lymph Nodes: Normal Palpation of lymph nodes in: Axilla, Cervical, Inguinal Abnormal Palpation of lymph nodes in: None  [de-identified] : Behavior is odd. Glassy eyes. Questioned carefully on his drug use, admits to suboxone use.  he is very vague on answering questions.  [de-identified] : LT deltoid weakness secondary to intrinsic problem.  [de-identified] : extension 5 degrees [] : clonus not sustained at ankle [de-identified] : Diffused non Dermatol paresthesia's in UE.

## 2024-08-11 NOTE — DATA REVIEWED
[FreeTextEntry1] : On my interpretation of these images from STAND-UP MRI on 04/7/24 I have additionally reviewed the radiologist report. CERVICAL FLEX/EX MRIs.  C2-3:NL C3-4: NL C4-5: NL  C5-6: mild DDD w/o stenosis.  C6-7: RT sided HNP causing compression of RT C7 root.  C7-T1: nl Flex/extension do not demonstrate listhesis or any change in position of HNP.   On my interpretation of these images from STAND UP MRI on  I have additionally reviewed the radiologist report. L5-S1: mod DDD w/ mod-severe bl fs, modic changes.  L4-5: MILD DDD w/ modic change, small central protrusion w/o significant stenosis.  L3-4: NL  L2-3: NL  L1-2: NL  T12-L1: NL T11-T12: mild DDD, small protrusion on sagittal only, flex/ex do not demonstrate any listhesis or change in position of disc protrusions.  Does not appear to be any acute HNPs, largely degenerative

## 2024-08-29 ENCOUNTER — APPOINTMENT (OUTPATIENT)
Dept: ORTHOPEDIC SURGERY | Facility: CLINIC | Age: 54
End: 2024-08-29

## 2024-08-29 ENCOUNTER — APPOINTMENT (OUTPATIENT)
Dept: ORTHOPEDIC SURGERY | Facility: CLINIC | Age: 54
End: 2024-08-29
Payer: COMMERCIAL

## 2024-08-29 VITALS — HEIGHT: 64 IN | BODY MASS INDEX: 35.17 KG/M2 | WEIGHT: 206 LBS

## 2024-08-29 PROCEDURE — 99214 OFFICE O/P EST MOD 30 MIN: CPT | Mod: ACP

## 2024-09-02 NOTE — IMAGING
3
[de-identified] : The patient is a well appearing 53 year old male of their stated age. Patient ambulates with a normal gait. Negative straight leg raise bilateral   Effected Knee: LEFT ROM:  0-125 degrees WITH PAIN Lachman: Negative Pivot Shift: Negative Anterior Drawer: Negative Posterior Drawer / Sag: Negative Varus Stress 0 degrees: Stable Varus Stress 30 degrees: Stable Valgus Stress 0 degrees: Stable Valgus Stress 30 degrees: Stable Medial Yanet: POSITIVE  Lateral Yanet: Negative Patella Glide: 2+ Patella Apprehension: Negative Patella Grind: Negative   Palpation: Medial Joint Line: TENDER  Lateral Joint Line: TENDER  Medial Collateral Ligament: Nontender Lateral Collateral Ligament/PLC: Nontender Distal Femur: Nontender Proximal Tibia: Nontender Tibial Tubercle: Nontender Distal Pole Patella: Nontender Quadriceps Tendon: Nontender &  Intact Patella Tendon: Nontender &  Intact Medial Femoral Condyle: Nontender Medial Distal Hamstring/PES: Nontender Lateral Distal Hamstring: Nontender & Stable Iliotibial Band: Nontender Medial Patellofemoral Ligament: Nontender Adductor: Nontender Proximal GSC-Plantaris: Nontender Calf: Supple & Nontender   Inspection: Deformity: No Erythema: No Ecchymosis: No Abrasions: No Effusion: No Prepatella Bursitis: YES Neurologic Exam: Sensation L4-S1: Grossly Intact Motor Exam: Quadriceps: 5 out of 5 Hamstrings: 5 out of 5 EHL: 5 out of 5 FHL: 5 out of 5 TA: 5 out of 5 GS: 5 out of 5 Circulatory/Pulses: Dorsalis Pedis: 2+ Posterior Tibialis: 2+ Additional Pertinent Findings: None     Contralateral Knee:                       ROM: 0-145 degrees Other Pertinent Findings: None   Assessment: The patient is a 53 year old male with Left knee pain and radiographic and physical exam findings consistent with   small oblique oriented medial meniscus tear. The patient's condition is acute Documents/Results Reviewed Today: MRI left knee Tests/Studies Independently Interpreted Today: MRI eft knee reveals evidence of mild to mod effusion, soft tissue edema, fluid collection anterior tibia, possibility of hematoma bursitis, small oblique oriented medial meniscus tear Pertinent findings include: 0-125 with pain,  Prepatella Bursitis, MJLT, LJLT, + Medial [facility] Confounding medical conditions/concerns: None     Plan: Discussed treatment options for the patient's small oblique medial meniscus tear. Patient will start physical therapy, HEP, and stretching. Advised patient to obtain Reparel sleeve. Discussed taking OTC anti-inflammatories as needed - use as directed. Modify activity as discussed. Tests Ordered: None  Prescription Medications Ordered: Discussed appropriate use of OTC anti-inflammatories and analgesics (including but not limited to Aleve, Advil, Tylenol, Motrin, Ibuprofen, Voltaren gel, etc.) Braces/DME Ordered: None Activity/Work/Sports Status: As tolerated  Additional Instructions: None Follow-Up: 4 weeks   The patient's current medication management of their orthopedic diagnosis was reviewed today: (1) We discussed a comprehensive treatment plan that included possible pharmaceutical management involving the use of prescription strength medications including but not limited to options such as oral Naprosyn 500mg BID, once daily Meloxicam 15 mg, or 500-650 mg Tylenol versus over the counter oral medications and topical prescription NSAID Pennsaid vs over the counter Voltaren gel.  Based on our extensive discussion, the patient declined prescription medication and will use over the counter Advil, Alleve, Voltaren Gel or Tylenol as directed. (2) There is a moderate risk of morbidity with further treatment, especially from use of prescription strength medications and possible side effects of these medications which include upset stomach with oral medications, skin reactions to topical medications and cardiac/renal issues with long term use. (3) I recommended that the patient follow-up with their medical physician to discuss any significant specific potential issues with long term medication use such as interactions with current medications or with exacerbation of underlying medical comorbidities. (4) The benefits and risks associated with use of injectable, oral or topical, prescription and over the counter anti-inflammatory medications were discussed with the patient. The patient voiced understanding of the risks including but not limited to bleeding, stroke, kidney dysfunction, heart disease, and were referred to the black box warning label for further information.  IAngelica attest that this documentation has been prepared under the direction and in the presence of Barbie Koehler PA-C.  The documentation recorded by the scribe accurately reflects the service ASIA Rivera PA-C personally performed and the decisions made by me.  
[de-identified] : The patient is a well appearing 53 year old male of their stated age. Patient ambulates with a normal gait. Negative straight leg raise bilateral   Effected Knee: LEFT ROM:  0-125 degrees WITH PAIN Lachman: Negative Pivot Shift: Negative Anterior Drawer: Negative Posterior Drawer / Sag: Negative Varus Stress 0 degrees: Stable Varus Stress 30 degrees: Stable Valgus Stress 0 degrees: Stable Valgus Stress 30 degrees: Stable Medial Yanet: POSITIVE  Lateral Yanet: Negative Patella Glide: 2+ Patella Apprehension: Negative Patella Grind: Negative   Palpation: Medial Joint Line: TENDER  Lateral Joint Line: TENDER  Medial Collateral Ligament: Nontender Lateral Collateral Ligament/PLC: Nontender Distal Femur: Nontender Proximal Tibia: Nontender Tibial Tubercle: Nontender Distal Pole Patella: Nontender Quadriceps Tendon: Nontender &  Intact Patella Tendon: Nontender &  Intact Medial Femoral Condyle: Nontender Medial Distal Hamstring/PES: Nontender Lateral Distal Hamstring: Nontender & Stable Iliotibial Band: Nontender Medial Patellofemoral Ligament: Nontender Adductor: Nontender Proximal GSC-Plantaris: Nontender Calf: Supple & Nontender   Inspection: Deformity: No Erythema: No Ecchymosis: No Abrasions: No Effusion: No Prepatella Bursitis: YES Neurologic Exam: Sensation L4-S1: Grossly Intact Motor Exam: Quadriceps: 5 out of 5 Hamstrings: 5 out of 5 EHL: 5 out of 5 FHL: 5 out of 5 TA: 5 out of 5 GS: 5 out of 5 Circulatory/Pulses: Dorsalis Pedis: 2+ Posterior Tibialis: 2+ Additional Pertinent Findings: None     Contralateral Knee:                       ROM: 0-145 degrees Other Pertinent Findings: None   Assessment: The patient is a 53 year old male with Left knee pain and radiographic and physical exam findings consistent with   small oblique oriented medial meniscus tear. The patient's condition is acute Documents/Results Reviewed Today: MRI left knee Tests/Studies Independently Interpreted Today: MRI eft knee reveals evidence of mild to mod effusion, soft tissue edema, fluid collection anterior tibia, possibility of hematoma bursitis, small oblique oriented medial meniscus tear Pertinent findings include: 0-125 with pain,  Prepatella Bursitis, MJLT, LJLT, + Medial [facility] Confounding medical conditions/concerns: None     Plan: Discussed treatment options for the patient's small oblique medial meniscus tear. Patient will start physical therapy, HEP, and stretching. Advised patient to obtain Reparel sleeve. Discussed taking OTC anti-inflammatories as needed - use as directed. Modify activity as discussed. Tests Ordered: None  Prescription Medications Ordered: Discussed appropriate use of OTC anti-inflammatories and analgesics (including but not limited to Aleve, Advil, Tylenol, Motrin, Ibuprofen, Voltaren gel, etc.) Braces/DME Ordered: None Activity/Work/Sports Status: As tolerated  Additional Instructions: None Follow-Up: 4 weeks   The patient's current medication management of their orthopedic diagnosis was reviewed today: (1) We discussed a comprehensive treatment plan that included possible pharmaceutical management involving the use of prescription strength medications including but not limited to options such as oral Naprosyn 500mg BID, once daily Meloxicam 15 mg, or 500-650 mg Tylenol versus over the counter oral medications and topical prescription NSAID Pennsaid vs over the counter Voltaren gel.  Based on our extensive discussion, the patient declined prescription medication and will use over the counter Advil, Alleve, Voltaren Gel or Tylenol as directed. (2) There is a moderate risk of morbidity with further treatment, especially from use of prescription strength medications and possible side effects of these medications which include upset stomach with oral medications, skin reactions to topical medications and cardiac/renal issues with long term use. (3) I recommended that the patient follow-up with their medical physician to discuss any significant specific potential issues with long term medication use such as interactions with current medications or with exacerbation of underlying medical comorbidities. (4) The benefits and risks associated with use of injectable, oral or topical, prescription and over the counter anti-inflammatory medications were discussed with the patient. The patient voiced understanding of the risks including but not limited to bleeding, stroke, kidney dysfunction, heart disease, and were referred to the black box warning label for further information.  IAngelica attest that this documentation has been prepared under the direction and in the presence of Barbie Koehler PA-C.  The documentation recorded by the scribe accurately reflects the service ASIA Rivera PA-C personally performed and the decisions made by me.

## 2024-09-02 NOTE — DATA REVIEWED
[MRI] : MRI [Left] : left [Knee] : knee [Report was reviewed and noted in the chart] : The report was reviewed and noted in the chart [I independently reviewed and interpreted images and report] : I independently reviewed and interpreted images and report [I reviewed the films/CD and additionally noted] : I reviewed the films/CD and additionally noted [FreeTextEntry1] : MRI eft knee reveals evidence of mild to mod effusion, soft tissue edema, fluid collection anterior tibia, possibility of hematoma bursitis, small oblique oriented medial meniscus tear
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
## 2024-09-05 ENCOUNTER — APPOINTMENT (OUTPATIENT)
Dept: ORTHOPEDIC SURGERY | Facility: CLINIC | Age: 54
End: 2024-09-05
Payer: COMMERCIAL

## 2024-09-05 VITALS — HEIGHT: 64 IN | BODY MASS INDEX: 35.17 KG/M2 | WEIGHT: 206 LBS

## 2024-09-05 DIAGNOSIS — M75.102 UNSPECIFIED ROTATOR CUFF TEAR OR RUPTURE OF LEFT SHOULDER, NOT SPECIFIED AS TRAUMATIC: ICD-10-CM

## 2024-09-05 DIAGNOSIS — M50.20 OTHER CERVICAL DISC DISPLACEMENT, UNSPECIFIED CERVICAL REGION: ICD-10-CM

## 2024-09-05 DIAGNOSIS — M70.52 OTHER BURSITIS OF KNEE, LEFT KNEE: ICD-10-CM

## 2024-09-05 DIAGNOSIS — S83.242A OTHER TEAR OF MEDIAL MENISCUS, CURRENT INJURY, LEFT KNEE, INITIAL ENCOUNTER: ICD-10-CM

## 2024-09-05 PROCEDURE — 99214 OFFICE O/P EST MOD 30 MIN: CPT

## 2024-09-05 NOTE — IMAGING
[de-identified] : The patient is a well appearing 53 year  old male of their stated age. Neck is supple & nontender to palpation. POSITIVE Spurling's test.  Effected Shoulder: LEFT Inspection:  Scapula Winging: Negative Deformity: None Erythema: None  Ecchymosis: None Abrasions: None Effusion: None   Range of Motion: Active Forward Flexion: 180 degrees   Active Abduction: 180 degrees  Passive Forward Flexion: 180 degrees  Passive Abduction: 180 degrees  ER @ 90 degrees: 90 degrees IR @ 90 degrees: 40 degrees ER @ 0 degrees: 40 degrees   Motor Exam:  Forward Flexion: 5 out of 5  Flexion Plane of Scapula: 5 out of 5  Abduction: 5 out of 5  Internal Rotation: 5 out of 5  External Rotation: 5 out of 5 Distal Motor Strength: 5 out of 5   Stability Testing: Anterior: 1+ Posterior: 1+ Sulcus N: 1+ Sulcus ER: 1+ Provocative Tests: Drop Arm: Negative Impingement: POSITIVE  [place]: Negative X-Arm Adduction: Negative Belly Press: Negative Bear Hug: Negative Lift Off: Negative Apprehension: Negative Relocation: Negative Posterior Load & Shift: Negative   Palpation: AC Joint: Nontender Clavicle: Nontender SC Joint: Nontender Bicepital Groove: Nontender Coracoid Process: Nontender Pectoralis Minor Tendon: Nontender  Pectoralis Major Tendon: Nontender & palpably intact Latissimus Dorsi: Nontender  Proximal Humerus: Nontender Scapula Body: Nontender Medial Scapula Boarder: Nontender Scapula Spine: Nontender   Neurologic Exam: Sensation to Light Touch:  Axillary: Grossly intact Ulnar: Grossly intact Radial: Grossly intact Median: Grossly intact Other:  N/A Circulatory/Pulses: Ulnar: 2+ Radial: 2+ Other Pertinent Findings: None   Contralateral Shoulder: Range of Motion: Active Forward Flexion: 180 degrees  Active Abduction: 180 degrees  Passive Forward Flexion: 180 degrees  Passive Abduction: 180 degrees  ER @ 90 degrees: 90 degrees IR @ 90 degrees: 45 degrees ER @ 0 degrees: 50 degrees Motor Exam: Forward Flexion: 5 out of 5 Flexion Plane of Scapula: 5 out of 5 Abduction: 5 out of 5 Internal Rotation: 5 out of 5 External Rotation: 5 out of 5 Distal Motor Strength: 5 out of 5 Stability Testing: Anterior: 1+ Posterior: 1+ Sulcus N: 1+ Sulcus ER: 1+ Other Pertinent Findings: None  >>>>>>>>>>>>>>>>>>>>>>>>>>>>>>>>>>>>>>>>>>>>>>>>>>>>>>>>>>>>>>>>>>>>>>>>>>>  Effected Knee: LEFT ROM: 0-125 degrees WITH PAIN Lachman: Negative Pivot Shift: Negative Anterior Drawer: Negative Posterior Drawer / Sag: Negative Varus Stress 0 degrees: Stable Varus Stress 30 degrees: Stable Valgus Stress 0 degrees: Stable Valgus Stress 30 degrees: Stable Medial [place]: POSITIVE Lateral [place]: Negative Patella Glide: 2+ Patella Apprehension: Negative Patella Grind: Negative  Palpation: TENDER ANTERIOR KNEE  Medial Joint Line: TENDER, mildly  Lateral Joint Line: Nontender   Medial Collateral Ligament: Nontender Lateral Collateral Ligament/PLC: Nontender Distal Femur: Nontender Proximal Tibia: Nontender Tibial Tubercle: Nontender Distal Pole Patella: Nontender Quadriceps Tendon: Nontender & Intact Patella Tendon: Nontender & Intact Medial Femoral Condyle: Nontender Medial Distal Hamstring/PES: Nontender Lateral Distal Hamstring: Nontender & Stable Iliotibial Band: Nontender Medial Patellofemoral Ligament: Nontender Adductor: Nontender Proximal GSC-Plantaris: Nontender Calf: Supple & Nontender  Inspection: Deformity: No Erythema: No Ecchymosis: No Abrasions: No Effusion: MILD  Prepatella Bursitis: YES Neurologic Exam: Sensation L4-S1: Grossly Intact Motor Exam: Quadriceps: 5 out of 5 Hamstrings: 5 out of 5 EHL: 5 out of 5 FHL: 5 out of 5 TA: 5 out of 5 GS: 5 out of 5 Circulatory/Pulses: Dorsalis Pedis: 2+ Posterior Tibialis: 2+ Additional Pertinent Findings: None   Assessment: The patient is a 53-year-old male with left shoulder and left knee pain and radiographic and physical exam findings consistent with partial rotator cuff tearing and cervical C6-C7 herniation, left knee anterior knee contusion and asymptomatic small MMT.  The patient's condition is acute Documents/Results Reviewed Today: Re-reviewed MRI left knee  Tests/Studies Independently Interpreted Today: Re-reviewed MRI left knee reveals evidence of anterior knee swelling and contusion, small medial meniscus tearing.  Pertinent findings include: LEFT SHOULDER: 180/180/90/40/40, grossly intact rotator cuff strength, +impingement, +spurling, LEFT KNEE: 0-125, mild effusion, 4+/5 quad strength, tender anterior knee, mild MJLT, Prepatella Bursitis, + Medial Mary Ann Confounding medical conditions/concerns: None   Plan: Again, we discussed treatment options for the patients partial rotator cuff tearing given cervical pathology. The patient has previously seen Dr. Shaffer and Dr. Nelson for discussion of indefinite treatment. We recommended he focus on treating his cervical spine through epidural injections before considering any arthroscopic rotator cuff repair. Given his grossly intact rotator cuff strength, the patient will defer any arthroscopy. The patient needs to be off suboxone for 6 months before getting surgery. Recommended seeing pain management for his neck and back as well before deciding to do surgery on his shoulder. We had an extensive conversation regarding the patients knee. Advised that a majority of his discomfort is related to knee contusion opposed to medial meniscus tearing. We recommended he get into formal physical therapy for his knee as well. Recommended he obtain a reparel knee sleeve. The patient will return here after seeing Dr. Shaffer.  Tests Ordered: None  Prescription Medications Ordered: Discussed appropriate use of OTC anti-inflammatories and analgesics (including but not limited to Aleve, Advil, Tylenol, Motrin, Ibuprofen, Voltaren gel, etc.) Braces/DME Ordered: Reparel knee sleeve  Activity/Work/Sports Status: As tolerated Additional Instructions: None Follow-Up: with Dr. Shaffer   The patient's current medication management of their orthopedic diagnosis was reviewed today: (1) We discussed a comprehensive treatment plan that included possible pharmaceutical management involving the use of prescription strength medications including but not limited to options such as oral Naprosyn 500mg BID, once daily Meloxicam 15 mg, or 500-650 mg Tylenol versus over-the-counter oral medications and topical prescription NSAID Pennsaid vs over the counter Voltaren gel.  Based on our extensive discussion, the patient declined prescription medication and will use over the counter Advil, Alleve, Voltaren Gel or Tylenol as directed. (2) There is a moderate risk of morbidity with further treatment, especially from use of prescription strength medications and possible side effects of these medications which include upset stomach with oral medications, skin reactions to topical medications and cardiac/renal issues with long term use. (3) I recommended that the patient follow-up with their medical physician to discuss any significant specific potential issues with long term medication use such as interactions with current medications or with exacerbation of underlying medical comorbidities. (4) The benefits and risks associated with use of injectable, oral or topical, prescription and over the counter anti-inflammatory medications were discussed with the patient. The patient voiced understanding of the risks including but not limited to bleeding, stroke, kidney dysfunction, heart disease, and were referred to the black box warning label for further information.  ICele attest that this documentation has been prepared under the direction and in the presence of Provider Dr. Edmundo Rose.   The documentation recorded by the scribe accurately reflects the services IDr. [email or web address], personally performed and the decisions made by me.

## 2024-09-05 NOTE — IMAGING
[de-identified] : The patient is a well appearing 53 year  old male of their stated age. Neck is supple & nontender to palpation. POSITIVE Spurling's test.  Effected Shoulder: LEFT Inspection:  Scapula Winging: Negative Deformity: None Erythema: None  Ecchymosis: None Abrasions: None Effusion: None   Range of Motion: Active Forward Flexion: 180 degrees   Active Abduction: 180 degrees  Passive Forward Flexion: 180 degrees  Passive Abduction: 180 degrees  ER @ 90 degrees: 90 degrees IR @ 90 degrees: 40 degrees ER @ 0 degrees: 40 degrees   Motor Exam:  Forward Flexion: 5 out of 5  Flexion Plane of Scapula: 5 out of 5  Abduction: 5 out of 5  Internal Rotation: 5 out of 5  External Rotation: 5 out of 5 Distal Motor Strength: 5 out of 5   Stability Testing: Anterior: 1+ Posterior: 1+ Sulcus N: 1+ Sulcus ER: 1+ Provocative Tests: Drop Arm: Negative Impingement: POSITIVE  [place]: Negative X-Arm Adduction: Negative Belly Press: Negative Bear Hug: Negative Lift Off: Negative Apprehension: Negative Relocation: Negative Posterior Load & Shift: Negative   Palpation: AC Joint: Nontender Clavicle: Nontender SC Joint: Nontender Bicepital Groove: Nontender Coracoid Process: Nontender Pectoralis Minor Tendon: Nontender  Pectoralis Major Tendon: Nontender & palpably intact Latissimus Dorsi: Nontender  Proximal Humerus: Nontender Scapula Body: Nontender Medial Scapula Boarder: Nontender Scapula Spine: Nontender   Neurologic Exam: Sensation to Light Touch:  Axillary: Grossly intact Ulnar: Grossly intact Radial: Grossly intact Median: Grossly intact Other:  N/A Circulatory/Pulses: Ulnar: 2+ Radial: 2+ Other Pertinent Findings: None   Contralateral Shoulder: Range of Motion: Active Forward Flexion: 180 degrees  Active Abduction: 180 degrees  Passive Forward Flexion: 180 degrees  Passive Abduction: 180 degrees  ER @ 90 degrees: 90 degrees IR @ 90 degrees: 45 degrees ER @ 0 degrees: 50 degrees Motor Exam: Forward Flexion: 5 out of 5 Flexion Plane of Scapula: 5 out of 5 Abduction: 5 out of 5 Internal Rotation: 5 out of 5 External Rotation: 5 out of 5 Distal Motor Strength: 5 out of 5 Stability Testing: Anterior: 1+ Posterior: 1+ Sulcus N: 1+ Sulcus ER: 1+ Other Pertinent Findings: None  >>>>>>>>>>>>>>>>>>>>>>>>>>>>>>>>>>>>>>>>>>>>>>>>>>>>>>>>>>>>>>>>>>>>>>>>>>>  Effected Knee: LEFT ROM: 0-125 degrees WITH PAIN Lachman: Negative Pivot Shift: Negative Anterior Drawer: Negative Posterior Drawer / Sag: Negative Varus Stress 0 degrees: Stable Varus Stress 30 degrees: Stable Valgus Stress 0 degrees: Stable Valgus Stress 30 degrees: Stable Medial [place]: POSITIVE Lateral [place]: Negative Patella Glide: 2+ Patella Apprehension: Negative Patella Grind: Negative  Palpation: TENDER ANTERIOR KNEE  Medial Joint Line: TENDER, mildly  Lateral Joint Line: Nontender   Medial Collateral Ligament: Nontender Lateral Collateral Ligament/PLC: Nontender Distal Femur: Nontender Proximal Tibia: Nontender Tibial Tubercle: Nontender Distal Pole Patella: Nontender Quadriceps Tendon: Nontender & Intact Patella Tendon: Nontender & Intact Medial Femoral Condyle: Nontender Medial Distal Hamstring/PES: Nontender Lateral Distal Hamstring: Nontender & Stable Iliotibial Band: Nontender Medial Patellofemoral Ligament: Nontender Adductor: Nontender Proximal GSC-Plantaris: Nontender Calf: Supple & Nontender  Inspection: Deformity: No Erythema: No Ecchymosis: No Abrasions: No Effusion: MILD  Prepatella Bursitis: YES Neurologic Exam: Sensation L4-S1: Grossly Intact Motor Exam: Quadriceps: 5 out of 5 Hamstrings: 5 out of 5 EHL: 5 out of 5 FHL: 5 out of 5 TA: 5 out of 5 GS: 5 out of 5 Circulatory/Pulses: Dorsalis Pedis: 2+ Posterior Tibialis: 2+ Additional Pertinent Findings: None   Assessment: The patient is a 53-year-old male with left shoulder and left knee pain and radiographic and physical exam findings consistent with partial rotator cuff tearing and cervical C6-C7 herniation, left knee anterior knee contusion and asymptomatic small MMT.  The patient's condition is acute Documents/Results Reviewed Today: Re-reviewed MRI left knee  Tests/Studies Independently Interpreted Today: Re-reviewed MRI left knee reveals evidence of anterior knee swelling and contusion, small medial meniscus tearing.  Pertinent findings include: LEFT SHOULDER: 180/180/90/40/40, grossly intact rotator cuff strength, +impingement, +spurling, LEFT KNEE: 0-125, mild effusion, 4+/5 quad strength, tender anterior knee, mild MJLT, Prepatella Bursitis, + Medial [place] Confounding medical conditions/concerns: None   Plan: Again, we discussed treatment options for the patients partial rotator cuff tearing given cervical pathology. The patient has previously seen Dr. Shaffer and Dr. Nelson for discussion of indefinite treatment. We recommended he focus on treating his cervical spine through epidural injections before considering any arthroscopic rotator cuff repair. Given his grossly intact rotator cuff strength, the patient will defer any arthroscopy. The patient needs to be off suboxone for 6 months before getting surgery. Recommended seeing pain management for his neck and back as well before deciding to do surgery on his shoulder. We had an extensive conversation regarding the patients knee. Advised that a majority of his discomfort is related to knee contusion opposed to medial meniscus tearing. We recommended he get into formal physical therapy for his knee as well. Recommended he obtain a reparel knee sleeve. The patient will return here after seeing Dr. Shaffer.  Tests Ordered: None  Prescription Medications Ordered: Discussed appropriate use of OTC anti-inflammatories and analgesics (including but not limited to Aleve, Advil, Tylenol, Motrin, Ibuprofen, Voltaren gel, etc.) Braces/DME Ordered: Reparel knee sleeve  Activity/Work/Sports Status: As tolerated Additional Instructions: None Follow-Up: with Dr. Shaffer   The patient's current medication management of their orthopedic diagnosis was reviewed today: (1) We discussed a comprehensive treatment plan that included possible pharmaceutical management involving the use of prescription strength medications including but not limited to options such as oral Naprosyn 500mg BID, once daily Meloxicam 15 mg, or 500-650 mg Tylenol versus over-the-counter oral medications and topical prescription NSAID Pennsaid vs over the counter Voltaren gel.  Based on our extensive discussion, the patient declined prescription medication and will use over the counter Advil, Alleve, Voltaren Gel or Tylenol as directed. (2) There is a moderate risk of morbidity with further treatment, especially from use of prescription strength medications and possible side effects of these medications which include upset stomach with oral medications, skin reactions to topical medications and cardiac/renal issues with long term use. (3) I recommended that the patient follow-up with their medical physician to discuss any significant specific potential issues with long term medication use such as interactions with current medications or with exacerbation of underlying medical comorbidities. (4) The benefits and risks associated with use of injectable, oral or topical, prescription and over the counter anti-inflammatory medications were discussed with the patient. The patient voiced understanding of the risks including but not limited to bleeding, stroke, kidney dysfunction, heart disease, and were referred to the black box warning label for further information.  ICele attest that this documentation has been prepared under the direction and in the presence of Provider Dr. Edmundo Rose.   The documentation recorded by the scribe accurately reflects the services IDr. Edmundo, personally performed and the decisions made by me.

## 2024-09-05 NOTE — DATA REVIEWED
[MRI] : MRI [Left] : left [Knee] : knee [Report was reviewed and noted in the chart] : The report was reviewed and noted in the chart [I independently reviewed and interpreted images and report] : I independently reviewed and interpreted images and report [I reviewed the films/CD and additionally noted] : I reviewed the films/CD and additionally noted [FreeTextEntry1] : Re-reviewed MRI left knee reveals evidence of anterior knee swelling and contusion, small medial meniscus tearing.
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
## 2024-09-27 ENCOUNTER — APPOINTMENT (OUTPATIENT)
Dept: ORTHOPEDIC SURGERY | Facility: CLINIC | Age: 54
End: 2024-09-27

## 2024-10-03 ENCOUNTER — APPOINTMENT (OUTPATIENT)
Dept: PAIN MANAGEMENT | Facility: CLINIC | Age: 54
End: 2024-10-03

## 2024-10-07 ENCOUNTER — APPOINTMENT (OUTPATIENT)
Dept: ORTHOPEDIC SURGERY | Facility: CLINIC | Age: 54
End: 2024-10-07

## 2024-10-07 NOTE — ASSESSMENT
[FreeTextEntry1] : A discussion regarding available pain management treatment options occurred with the patient.  These included interventional, rehabilitative, pharmacological, and alternative modalities. We will proceed with the following:    Interventional treatment options:   - Proceed with C7-T1 TRUDI with fluoroscopic guidance - Explained diagnostic and potentially therapeutic role for indicated procedure to distinguish between cervical spine and shoulder pathology - Consideration for TPI for refractory cervical myofascial pain component - see additional instructions below    Rehabilitative options:   - initiate trial of physical therapy for cervical spine - participation in active HEP was discussed    Medication based treatment options:   - Continue meloxicam 7.5-15 mg daily as needed - Add tizanidine 4 mg up to BID as needed for spasm - see additional instructions below    Complementary treatment options:   - Weight management and lifestyle modifications discussed   - See additional instructions below    Additional treatment recommendations as follows:   - Advised orthopedic spine surgical evaluation; bilateral cervical radiculopathy with upper extremity weakness - Advised orthopedic follow-up as indicated for shoulder pain  - Follow up 1-2 weeks post injection for assessment of efficacy and further treatment recommendations  We have discussed the risks, benefits, and alternatives for NSAID therapy including but not limited to the risk of bleeding, thrombosis, gastric mucosal irritation/ulceration, allergic reaction and kidney dysfunction.  The patient verbalizes an understanding.  The risks, benefits and alternatives of the proposed procedure were explained in detail with the patient. The risks outlined include but are not limited to infection, bleeding, post- dural puncture headache, nerve injury, a temporary increase in pain, failure to resolve symptoms, need for future interventions, allergic reaction, and possible elevation of blood sugar in diabetics if using corticosteroid.  All questions were answered to patient's apparent satisfaction, and he/she verbalized an understanding.
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
## 2024-10-07 NOTE — REASON FOR VISIT
[Initial Consultation] : an initial pain management consultation [FreeTextEntry2] : Neck/bilateral arm pain

## 2024-10-07 NOTE — PHYSICAL EXAM
[de-identified] : Constitutional:   - No acute distress   - Obese  Neurological:   - normal mood and affect   - alert and oriented x 3     Cardiovascular:   - grossly normal   Cervical Spine Exam:   Inspection:   erythema (-)   ecchymosis (-)   rashes (-)    Palpation:                                                    Cervical paraspinal tenderness:         R (+); L(+)  Upper trapezius tenderness:              R (+); L (+)  Rhomboids tenderness:                      R (-); L (-)  Occipital Ridge:                                    R (-); L (-)  Supraspinatus tenderness:                 R (-); L (-)   ROM: Reduced ROM all planes pain throughout ROM testing  Strength Testing:              Deltoid                           R (5/5); L (5/5)  Biceps:                          R (4/5); L (4/5)  Triceps:                         R (5/5); L (5/5)  Finger Abductors:         R (5/5); L (5/5)  Grasp:                           R (4/5); L (4/5)   Special Testing:  Spurling Test:                  R (+); L (+)  Facet load test:               R (-); L (-)   Neuro:  SILT throughout right upper extremity  SILT throughout left upper extremity   Reflexes:  Biceps   -           R (2+); L (2+)  Triceps  -           R (2+); L (2+)  Brachioradialis- R (2+); L (2+)     No ankle clonus

## 2024-10-17 ENCOUNTER — APPOINTMENT (OUTPATIENT)
Dept: ORTHOPEDIC SURGERY | Facility: CLINIC | Age: 54
End: 2024-10-17

## 2025-05-18 ENCOUNTER — NON-APPOINTMENT (OUTPATIENT)
Age: 55
End: 2025-05-18